# Patient Record
Sex: FEMALE | Race: WHITE | NOT HISPANIC OR LATINO | Employment: FULL TIME | URBAN - METROPOLITAN AREA
[De-identification: names, ages, dates, MRNs, and addresses within clinical notes are randomized per-mention and may not be internally consistent; named-entity substitution may affect disease eponyms.]

---

## 2020-08-05 ENCOUNTER — TELEMEDICINE (OUTPATIENT)
Dept: FAMILY MEDICINE CLINIC | Facility: CLINIC | Age: 25
End: 2020-08-05

## 2020-08-05 DIAGNOSIS — R05.9 COUGH: Primary | ICD-10-CM

## 2020-08-05 DIAGNOSIS — R53.83 EXHAUSTION: ICD-10-CM

## 2020-08-05 PROCEDURE — 99203 OFFICE O/P NEW LOW 30 MIN: CPT | Performed by: FAMILY MEDICINE

## 2020-08-05 NOTE — PROGRESS NOTES
" VIDEO VISIT  Enedina Maxwell is a 25 y.o. female.     Chief Complaint   Patient presents with   • Cough       HPI     Wants to establish care and discuss some recent episodes that are concerning to her.     About a month ago had a cough and low grade fever for an evening followed by a day of general malaise. Cough is never present during the day.  Seems these episodes always happen following a rather stressful day.  She herself is wondered if this is potentially stress related.  Has not been sleeping well, staying up late at night to do craft orders for her friends.   has recommended that she get more sleep.  On the days following these episodes she usually does sleep most of the day and \"catches up\".  Feels significantly better thereafter.    Recently seen by OB/GYN, diagnosed with probable PCOS.  Has been working hard to lose weight, hoping that that controls her symptoms.  Reports a recent weight loss of about 65 pounds over the past 6 months.    The following portions of the patient's history were reviewed and updated as appropriate: allergies, current medications, past family history, past medical history, past social history, past surgical history and problem list.    Review of Systems   Constitutional: Negative for chills, fatigue, fever and unexpected weight change.   HENT: Negative for sore throat.    Respiratory: Negative for cough, shortness of breath and wheezing.    Cardiovascular: Negative for chest pain, palpitations and leg swelling.   Gastrointestinal: Negative for abdominal distention, abdominal pain, constipation, diarrhea, nausea and vomiting.   Genitourinary: Negative for dysuria.   Musculoskeletal: Negative for arthralgias and myalgias.   Skin: Negative for rash.   Neurological: Negative for dizziness.   Psychiatric/Behavioral: Negative for confusion and dysphoric mood. The patient is not nervous/anxious.          Objective  There were no vitals filed for this visit.  There is no height " or weight on file to calculate BMI.    Physical Exam   Constitutional: She is oriented to person, place, and time. She appears well-developed and well-nourished. No distress.   HENT:   Right Ear: External ear normal.   Left Ear: External ear normal.   Nose: Nose normal.   Eyes: Pupils are equal, round, and reactive to light. EOM are normal.   Neck: Normal range of motion.   Pulmonary/Chest: Effort normal. No respiratory distress.   Neurological: She is alert and oriented to person, place, and time.   Skin: Skin is dry.   Psychiatric: She has a normal mood and affect. Her behavior is normal.       No current outpatient medications on file.  Current outpatient and discharge medications have been reconciled for the patient.  Reviewed by: Nic Vasquez MD      Procedures    Lab Results (most recent)     None                  Enedina was seen today for cough.    Diagnoses and all orders for this visit:    Cough  -     COVID-19,[facility] ROUTINE, NP/OP SWAB IN TRANSPORT MEDIA OR ESWAB 72 HR TAT - Swab, Anterior nasal; Future    Exhaustion    Will check COVID labs as above given the fact that she is missed some work.  This will do very least to put her employer at ease.    Discussed with her that this is likely related to exhaustion and that taking better care of herself may very well resolve all of her symptoms.  Recommended that she try to get some regular sleep and see if this goes away.  She will let me know if it does not.    Any information loaded into the AVS was placed there by ARSALAN Vasquez MD, and patient was counseled on the same.   Return in about 3 months (around 11/5/2020), or if symptoms worsen or fail to improve.      ARSALAN Vasquez MD

## 2020-08-18 ENCOUNTER — OFFICE VISIT (OUTPATIENT)
Dept: FAMILY MEDICINE CLINIC | Facility: CLINIC | Age: 25
End: 2020-08-18

## 2020-08-18 VITALS
HEART RATE: 56 BPM | RESPIRATION RATE: 16 BRPM | BODY MASS INDEX: 28.12 KG/M2 | WEIGHT: 175 LBS | HEIGHT: 66 IN | TEMPERATURE: 97.3 F | DIASTOLIC BLOOD PRESSURE: 80 MMHG | SYSTOLIC BLOOD PRESSURE: 130 MMHG | OXYGEN SATURATION: 99 %

## 2020-08-18 DIAGNOSIS — J38.3 VOCAL CORD DYSFUNCTION: Primary | ICD-10-CM

## 2020-08-18 PROCEDURE — 99213 OFFICE O/P EST LOW 20 MIN: CPT | Performed by: FAMILY MEDICINE

## 2020-08-18 NOTE — PATIENT INSTRUCTIONS
Ear drops - 1/2 white vinegar, 1/2 rubbing alcohol - 1-2 drops per ear after showers    Mindfulness apps: Headspace, Insight Timer    Mindfulness-Based Stress Reduction  Mindfulness-based stress reduction (MBSR) is a program that helps people learn to practice mindfulness. Mindfulness is the practice of intentionally paying attention to the present moment. It can be learned and practiced through techniques such as education, breathing exercises, meditation, and yoga. MBSR includes several mindfulness techniques in one program.  MBSR works best when you understand the treatment, are willing to try new things, and can commit to spending time practicing what you learn. MBSR training may include learning about:  · How your emotions, thoughts, and reactions affect your body.  · New ways to respond to things that cause negative thoughts to start (triggers).  · How to notice your thoughts and let go of them.  · Practicing awareness of everyday things that you normally do without thinking.  · The techniques and goals of different types of meditation.  What are the benefits of MBSR?  MBSR can have many benefits, which include helping you to:  · Develop self-awareness. This refers to knowing and understanding yourself.  · Learn skills and attitudes that help you to participate in your own health care.  · Learn new ways to care for yourself.  · Be more accepting about how things are, and let things go.  · Be less judgmental and approach things with an open mind.  · Be patient with yourself and trust yourself more.  MBSR has also been shown to:  · Reduce negative emotions, such as depression and anxiety.  · Improve memory and focus.  · Change how you sense and approach pain.  · Boost your body's ability to fight infections.  · Help you connect better with other people.  · Improve your sense of well-being.  Follow these instructions at home:    · Find a local in-person or online MBSR program.  · Set aside some time regularly for  mindfulness practice.  · Find a mindfulness practice that works best for you. This may include one or more of the following:  ? Meditation. Meditation involves focusing your mind on a certain thought or activity.  ? Breathing awareness exercises. These help you to stay present by focusing on your breath.  ? Body scan. For this practice, you lie down and pay attention to each part of your body from head to toe. You can identify tension and soreness and intentionally relax parts of your body.  ? Yoga. Yoga involves stretching and breathing, and it can improve your ability to move and be flexible. It can also provide an experience of testing your body's limits, which can help you release stress.  ? Mindful eating. This way of eating involves focusing on the taste, texture, color, and smell of each bite of food. Because this slows down eating and helps you feel full sooner, it can be an important part of a weight-loss plan.  · Find a podcast or recording that provides guidance for breathing awareness, body scan, or meditation exercises. You can listen to these any time when you have a free moment to rest without distractions.  · Follow your treatment plan as told by your health care provider. This may include taking regular medicines and making changes to your diet or lifestyle as recommended.  How to practice mindfulness  To do a basic awareness exercise:  · Find a comfortable place to sit.  · Pay attention to the present moment. Observe your thoughts, feelings, and surroundings just as they are.  · Avoid placing judgment on yourself, your feelings, or your surroundings. Make note of any judgment that comes up, and let it go.  · Your mind may wander, and that is okay. Make note of when your thoughts drift, and return your attention to the present moment.  To do basic mindfulness meditation:  · Find a comfortable place to sit. This may include a stable chair or a firm floor cushion.  ? Sit upright with your back  straight. Let your arms fall next to your side with your hands resting on your legs.  ? If sitting in a chair, rest your feet flat on the floor.  ? If sitting on a cushion, cross your legs in front of you.  · Keep your head in a neutral position with your chin dropped slightly. Relax your jaw and rest the tip of your tongue on the roof of your mouth. Drop your gaze to the floor. You can close your eyes if you like.  · Breathe normally and pay attention to your breath. Feel the air moving in and out of your nose. Feel your belly expanding and relaxing with each breath.  · Your mind may wander, and that is okay. Make note of when your thoughts drift, and return your attention to your breath.  · Avoid placing judgment on yourself, your feelings, or your surroundings. Make note of any judgment or feelings that come up, let them go, and bring your attention back to your breath.  · When you are ready, lift your gaze or open your eyes. Pay attention to how your body feels after the meditation.  Where to find more information  You can find more information about MBSR from:  · Your health care provider.  · Community-based meditation centers or programs.  · Programs offered near you.  Summary  · Mindfulness-based stress reduction (MBSR) is a program that teaches you how to intentionally pay attention to the present moment. It is used with other treatments to help you cope better with daily stress, emotions, and pain.  · MBSR focuses on developing self-awareness, which allows you to respond to life stress without judgment or negative emotions.  · MBSR programs may involve learning different mindfulness practices, such as breathing exercises, meditation, yoga, body scan, or mindful eating. Find a mindfulness practice that works best for you, and set aside time for it on a regular basis.  This information is not intended to replace advice given to you by your health care provider. Make sure you discuss any questions you have  with your health care provider.  Document Released: 04/26/2018 Document Revised: 11/30/2018 Document Reviewed: 04/26/2018  Elsevier Patient Education © 2020 [employer]       Antiemetics to include: Fosaprepitant, ondansetron, lorazepam, olanzapine  IV hydration

## 2020-08-18 NOTE — PROGRESS NOTES
Enedina Maxwell is 25 y.o. and presents today for:     Chief Complaint   Patient presents with   • Cough       HPI     Here with ongoing problems with paroxysms of cough and breathlessness, most common at night.  Most recent episode was 2 nights ago.  Woke up with some cough, felt like the cough was not letting up.  Eventually had some posttussive emesis and felt poorly for most of the next day.  Had a video visit for this same issue earlier, COVID testing was negative.  Denies any recent fevers or chills.  Denies any general shortness of breath.  Is quite active and able to exercise without difficulty.  Does note some general feelings of anxiety surrounding these episodes.  Does not sleep with any air blowing on her face.  No history of allergies or asthma.    Does note that she was making some noises with breathing, most frequently with inspiration.  Felt that she could not get a deep breath which contributed to her overall anxiety and the situation.    The following portions of the patient's history were reviewed and updated as appropriate: allergies, current medications, past family history, past medical history, past social history, past surgical history and problem list.    Review of Systems   Constitutional: Negative for activity change, chills, fatigue and fever.   Respiratory: Positive for cough, chest tightness and shortness of breath.    Psychiatric/Behavioral: Positive for sleep disturbance. The patient is nervous/anxious.        Objective  Vitals:    08/18/20 1006   BP: 130/80   Pulse: 56   Resp: 16   Temp: 97.3 °F (36.3 °C)   SpO2: 99%     Body mass index is 28.25 kg/m².    Physical Exam   Constitutional: She is oriented to person, place, and time. She appears well-developed and well-nourished. No distress.   HENT:   Right Ear: External ear normal.   Left Ear: External ear normal.   Nose: Nose normal.   Mouth/Throat: Oropharynx is clear and moist.   Eyes: Pupils are equal, round, and reactive to  light. EOM are normal.   Neck: Normal range of motion. Neck supple.   Cardiovascular: Normal rate, regular rhythm, normal heart sounds and intact distal pulses.   No murmur heard.  Pulmonary/Chest: Effort normal and breath sounds normal. No respiratory distress. She has no wheezes.   Abdominal: Soft. Bowel sounds are normal. There is no tenderness. There is no rebound and no guarding.   Musculoskeletal: Normal range of motion.   Neurological: She is alert and oriented to person, place, and time.   Skin: Skin is warm and dry.   Psychiatric: She has a normal mood and affect. Her behavior is normal.   Nursing note and vitals reviewed.      No current outpatient medications on file.  Current outpatient and discharge medications have been reconciled for the patient.  Reviewed by: Nic Vasquez MD      Procedures    Lab Results (most recent)     None                Enedina was seen today for cough.    Diagnoses and all orders for this visit:    Vocal cord dysfunction    We long discussion about these episodes, I do think that they likely represent anxiety attacks with some vocal cord dysfunction.  Her exam is otherwise completely unremarkable without suggestion of allergies or asthma.  She seems amenable to this explanation.  We explained some tricks she can try to help decrease the anxiety during these attacks and talk herself through them.  Given some references for mindfulness in her after visit summary today.  She will follow-up if the episodes continue.  Could consider referral to otolaryngology for more formal diagnosis as needed.    Any information loaded into the AVS was placed there by ARSALAN Vasquez MD, and patient was counseled on the same.   Return in about 3 months (around 11/18/2020), or if symptoms worsen or fail to improve.      ARSALAN Vasquez MD

## 2021-04-16 ENCOUNTER — BULK ORDERING (OUTPATIENT)
Dept: CASE MANAGEMENT | Facility: OTHER | Age: 26
End: 2021-04-16

## 2021-04-16 DIAGNOSIS — Z23 IMMUNIZATION DUE: ICD-10-CM

## 2022-05-10 LAB
EXTERNAL ABO GROUPING: NORMAL
EXTERNAL ANTIBODY SCREEN: NEGATIVE
EXTERNAL HEPATITIS B SURFACE ANTIGEN: NEGATIVE
EXTERNAL HEPATITIS C AB: NEGATIVE
EXTERNAL RH FACTOR: POSITIVE
EXTERNAL RUBELLA QUALITATIVE: NORMAL
EXTERNAL SYPHILIS RPR SCREEN: NORMAL
HIV1 P24 AG SERPL QL IA: NEGATIVE

## 2022-11-28 LAB — EXTERNAL GROUP B STREP ANTIGEN: POSITIVE

## 2022-12-19 ENCOUNTER — HOSPITAL ENCOUNTER (INPATIENT)
Facility: HOSPITAL | Age: 27
LOS: 2 days | Discharge: HOME | End: 2022-12-21
Attending: OBSTETRICS & GYNECOLOGY | Admitting: OBSTETRICS & GYNECOLOGY
Payer: COMMERCIAL

## 2022-12-19 PROBLEM — Z34.90 PREGNANCY: Status: ACTIVE | Noted: 2022-12-19

## 2022-12-19 PROBLEM — Z34.90 PREGNANCY: Status: RESOLVED | Noted: 2022-12-19 | Resolved: 2022-12-19

## 2022-12-19 LAB
ABO GROUP BLD: NORMAL
ALBUMIN SERPL-MCNC: 4 G/DL (ref 3.5–5.2)
ALBUMIN/GLOB SERPL: 1.1 G/DL
ALP SERPL-CCNC: 195 U/L (ref 39–117)
ALT SERPL W P-5'-P-CCNC: 10 U/L (ref 1–33)
ANION GAP SERPL CALCULATED.3IONS-SCNC: 16.3 MMOL/L (ref 5–15)
AST SERPL-CCNC: 21 U/L (ref 1–32)
BASOPHILS # BLD AUTO: 0.02 10*3/MM3 (ref 0–0.2)
BASOPHILS NFR BLD AUTO: 0.1 % (ref 0–1.5)
BILIRUB SERPL-MCNC: 0.4 MG/DL (ref 0–1.2)
BLD GP AB SCN SERPL QL: NEGATIVE
BUN SERPL-MCNC: 8 MG/DL (ref 6–20)
BUN/CREAT SERPL: 12.1 (ref 7–25)
CALCIUM SPEC-SCNC: 10 MG/DL (ref 8.6–10.5)
CHLORIDE SERPL-SCNC: 98 MMOL/L (ref 98–107)
CO2 SERPL-SCNC: 20.7 MMOL/L (ref 22–29)
CREAT SERPL-MCNC: 0.66 MG/DL (ref 0.57–1)
DEPRECATED RDW RBC AUTO: 37.9 FL (ref 37–54)
EGFRCR SERPLBLD CKD-EPI 2021: 123.5 ML/MIN/1.73
EOSINOPHIL # BLD AUTO: 0 10*3/MM3 (ref 0–0.4)
EOSINOPHIL NFR BLD AUTO: 0 % (ref 0.3–6.2)
ERYTHROCYTE [DISTWIDTH] IN BLOOD BY AUTOMATED COUNT: 12.1 % (ref 12.3–15.4)
GLOBULIN UR ELPH-MCNC: 3.6 GM/DL
GLUCOSE SERPL-MCNC: 76 MG/DL (ref 65–99)
HCT VFR BLD AUTO: 38.1 % (ref 34–46.6)
HGB BLD-MCNC: 13.6 G/DL (ref 12–15.9)
LYMPHOCYTES # BLD AUTO: 0.98 10*3/MM3 (ref 0.7–3.1)
LYMPHOCYTES NFR BLD AUTO: 5.8 % (ref 19.6–45.3)
MCH RBC QN AUTO: 30.6 PG (ref 26.6–33)
MCHC RBC AUTO-ENTMCNC: 35.7 G/DL (ref 31.5–35.7)
MCV RBC AUTO: 85.6 FL (ref 79–97)
MONOCYTES # BLD AUTO: 0.46 10*3/MM3 (ref 0.1–0.9)
MONOCYTES NFR BLD AUTO: 2.7 % (ref 5–12)
NEUTROPHILS NFR BLD AUTO: 15.25 10*3/MM3 (ref 1.7–7)
NEUTROPHILS NFR BLD AUTO: 90.7 % (ref 42.7–76)
PLATELET # BLD AUTO: 197 10*3/MM3 (ref 140–450)
PMV BLD AUTO: 12.9 FL (ref 6–12)
POTASSIUM SERPL-SCNC: 3.9 MMOL/L (ref 3.5–5.2)
PROT SERPL-MCNC: 7.6 G/DL (ref 6–8.5)
RBC # BLD AUTO: 4.45 10*6/MM3 (ref 3.77–5.28)
RH BLD: POSITIVE
SODIUM SERPL-SCNC: 135 MMOL/L (ref 136–145)
T&S EXPIRATION DATE: NORMAL
WBC NRBC COR # BLD: 16.83 10*3/MM3 (ref 3.4–10.8)

## 2022-12-19 PROCEDURE — 25010000002 OXYTOCIN PER 10 UNITS: Performed by: OBSTETRICS & GYNECOLOGY

## 2022-12-19 PROCEDURE — 86900 BLOOD TYPING SEROLOGIC ABO: CPT | Performed by: OBSTETRICS & GYNECOLOGY

## 2022-12-19 PROCEDURE — 99202 OFFICE O/P NEW SF 15 MIN: CPT | Performed by: OBSTETRICS & GYNECOLOGY

## 2022-12-19 PROCEDURE — 85025 COMPLETE CBC W/AUTO DIFF WBC: CPT | Performed by: OBSTETRICS & GYNECOLOGY

## 2022-12-19 PROCEDURE — 86901 BLOOD TYPING SEROLOGIC RH(D): CPT | Performed by: OBSTETRICS & GYNECOLOGY

## 2022-12-19 PROCEDURE — 25010000002 PENICILLIN G POTASSIUM PER 600000 UNITS: Performed by: OBSTETRICS & GYNECOLOGY

## 2022-12-19 PROCEDURE — 25010000002 ONDANSETRON PER 1 MG: Performed by: OBSTETRICS & GYNECOLOGY

## 2022-12-19 PROCEDURE — 80053 COMPREHEN METABOLIC PANEL: CPT | Performed by: OBSTETRICS & GYNECOLOGY

## 2022-12-19 PROCEDURE — 86850 RBC ANTIBODY SCREEN: CPT | Performed by: OBSTETRICS & GYNECOLOGY

## 2022-12-19 PROCEDURE — 0KQM0ZZ REPAIR PERINEUM MUSCLE, OPEN APPROACH: ICD-10-PCS | Performed by: OBSTETRICS & GYNECOLOGY

## 2022-12-19 RX ORDER — SODIUM CHLORIDE 0.9 % (FLUSH) 0.9 %
10 SYRINGE (ML) INJECTION AS NEEDED
Status: DISCONTINUED | OUTPATIENT
Start: 2022-12-19 | End: 2022-12-19

## 2022-12-19 RX ORDER — PENICILLIN G 3000000 [IU]/50ML
3 INJECTION, SOLUTION INTRAVENOUS EVERY 4 HOURS
Status: DISCONTINUED | OUTPATIENT
Start: 2022-12-19 | End: 2022-12-19 | Stop reason: HOSPADM

## 2022-12-19 RX ORDER — SODIUM CHLORIDE, SODIUM LACTATE, POTASSIUM CHLORIDE, CALCIUM CHLORIDE 600; 310; 30; 20 MG/100ML; MG/100ML; MG/100ML; MG/100ML
125 INJECTION, SOLUTION INTRAVENOUS CONTINUOUS
Status: DISCONTINUED | OUTPATIENT
Start: 2022-12-19 | End: 2022-12-20

## 2022-12-19 RX ORDER — MAGNESIUM CARB/ALUMINUM HYDROX 105-160MG
30 TABLET,CHEWABLE ORAL ONCE AS NEEDED
Status: DISCONTINUED | OUTPATIENT
Start: 2022-12-19 | End: 2022-12-19

## 2022-12-19 RX ORDER — ONDANSETRON 2 MG/ML
4 INJECTION INTRAMUSCULAR; INTRAVENOUS EVERY 6 HOURS PRN
Status: DISCONTINUED | OUTPATIENT
Start: 2022-12-19 | End: 2022-12-19 | Stop reason: HOSPADM

## 2022-12-19 RX ORDER — MISOPROSTOL 200 UG/1
800 TABLET ORAL ONCE AS NEEDED
Status: DISCONTINUED | OUTPATIENT
Start: 2022-12-19 | End: 2022-12-19 | Stop reason: HOSPADM

## 2022-12-19 RX ORDER — OXYTOCIN/0.9 % SODIUM CHLORIDE 30/500 ML
125 PLASTIC BAG, INJECTION (ML) INTRAVENOUS CONTINUOUS PRN
Status: COMPLETED | OUTPATIENT
Start: 2022-12-19 | End: 2022-12-19

## 2022-12-19 RX ORDER — OXYCODONE AND ACETAMINOPHEN 10; 325 MG/1; MG/1
1 TABLET ORAL EVERY 4 HOURS PRN
Status: DISCONTINUED | OUTPATIENT
Start: 2022-12-19 | End: 2022-12-21 | Stop reason: HOSPADM

## 2022-12-19 RX ORDER — IBUPROFEN 800 MG/1
800 TABLET ORAL EVERY 8 HOURS PRN
Status: DISCONTINUED | OUTPATIENT
Start: 2022-12-19 | End: 2022-12-21 | Stop reason: HOSPADM

## 2022-12-19 RX ORDER — FAMOTIDINE 10 MG/ML
20 INJECTION, SOLUTION INTRAVENOUS 2 TIMES DAILY PRN
Status: DISCONTINUED | OUTPATIENT
Start: 2022-12-19 | End: 2022-12-19 | Stop reason: HOSPADM

## 2022-12-19 RX ORDER — METHYLERGONOVINE MALEATE 0.2 MG/ML
200 INJECTION INTRAVENOUS ONCE AS NEEDED
Status: DISCONTINUED | OUTPATIENT
Start: 2022-12-19 | End: 2022-12-19 | Stop reason: HOSPADM

## 2022-12-19 RX ORDER — LIDOCAINE HYDROCHLORIDE 20 MG/ML
JELLY TOPICAL ONCE
Status: COMPLETED | OUTPATIENT
Start: 2022-12-19 | End: 2022-12-19

## 2022-12-19 RX ORDER — OXYCODONE HYDROCHLORIDE AND ACETAMINOPHEN 5; 325 MG/1; MG/1
1 TABLET ORAL EVERY 4 HOURS PRN
Status: DISCONTINUED | OUTPATIENT
Start: 2022-12-19 | End: 2022-12-21 | Stop reason: HOSPADM

## 2022-12-19 RX ORDER — ONDANSETRON 4 MG/1
4 TABLET, FILM COATED ORAL EVERY 6 HOURS PRN
Status: DISCONTINUED | OUTPATIENT
Start: 2022-12-19 | End: 2022-12-19 | Stop reason: HOSPADM

## 2022-12-19 RX ORDER — OXYTOCIN/0.9 % SODIUM CHLORIDE 30/500 ML
250 PLASTIC BAG, INJECTION (ML) INTRAVENOUS CONTINUOUS
Status: DISPENSED | OUTPATIENT
Start: 2022-12-19 | End: 2022-12-19

## 2022-12-19 RX ORDER — TERBUTALINE SULFATE 1 MG/ML
0.25 INJECTION, SOLUTION SUBCUTANEOUS AS NEEDED
Status: DISCONTINUED | OUTPATIENT
Start: 2022-12-19 | End: 2022-12-19 | Stop reason: HOSPADM

## 2022-12-19 RX ORDER — ACETAMINOPHEN 325 MG/1
650 TABLET ORAL EVERY 4 HOURS PRN
Status: DISCONTINUED | OUTPATIENT
Start: 2022-12-19 | End: 2022-12-19 | Stop reason: HOSPADM

## 2022-12-19 RX ORDER — FAMOTIDINE 20 MG/1
20 TABLET, FILM COATED ORAL 2 TIMES DAILY PRN
Status: DISCONTINUED | OUTPATIENT
Start: 2022-12-19 | End: 2022-12-19 | Stop reason: HOSPADM

## 2022-12-19 RX ORDER — OXYTOCIN/0.9 % SODIUM CHLORIDE 30/500 ML
999 PLASTIC BAG, INJECTION (ML) INTRAVENOUS ONCE
Status: COMPLETED | OUTPATIENT
Start: 2022-12-19 | End: 2022-12-19

## 2022-12-19 RX ORDER — CARBOPROST TROMETHAMINE 250 UG/ML
250 INJECTION, SOLUTION INTRAMUSCULAR
Status: DISCONTINUED | OUTPATIENT
Start: 2022-12-19 | End: 2022-12-19 | Stop reason: HOSPADM

## 2022-12-19 RX ORDER — SODIUM CHLORIDE 0.9 % (FLUSH) 0.9 %
10 SYRINGE (ML) INJECTION EVERY 12 HOURS SCHEDULED
Status: DISCONTINUED | OUTPATIENT
Start: 2022-12-19 | End: 2022-12-19

## 2022-12-19 RX ADMIN — PENICILLIN G POTASSIUM 5 MILLION UNITS: 5000000 INJECTION, POWDER, FOR SOLUTION INTRAMUSCULAR; INTRAVENOUS at 17:38

## 2022-12-19 RX ADMIN — OXYCODONE AND ACETAMINOPHEN 1 TABLET: 325; 10 TABLET ORAL at 22:19

## 2022-12-19 RX ADMIN — ONDANSETRON 4 MG: 2 INJECTION INTRAMUSCULAR; INTRAVENOUS at 20:32

## 2022-12-19 RX ADMIN — OXYTOCIN 999 ML/HR: 10 INJECTION, SOLUTION INTRAMUSCULAR; INTRAVENOUS at 21:26

## 2022-12-19 RX ADMIN — OXYTOCIN 125 ML/HR: 10 INJECTION, SOLUTION INTRAMUSCULAR; INTRAVENOUS at 23:09

## 2022-12-19 RX ADMIN — LIDOCAINE HYDROCHLORIDE 1 ML: 20 JELLY TOPICAL at 21:31

## 2022-12-19 RX ADMIN — SODIUM CHLORIDE, POTASSIUM CHLORIDE, SODIUM LACTATE AND CALCIUM CHLORIDE 999 ML/HR: 600; 310; 30; 20 INJECTION, SOLUTION INTRAVENOUS at 17:37

## 2022-12-19 NOTE — OBED NOTES
BETTYE Note OB        Patient Name: Enedina Maxwell  YOB: 1995  MRN: [id number]  Admission Date: 2022  3:30 PM  Date of Service: 2022    Chief Complaint: Contractions (BETTYE: Patient c/o contractions every 2 minutes for the last 2 hours, rating  8/10 on the pain scale.  Good FM, feels like SROM around 1100, no VB.)        Subjective     Enedina Maxwell is a 27 y.o. female  at 38w6d with Estimated Date of Delivery: 22 who presents with the chief complaint listed above.  She sees Merry Louis MD for her prenatal care. Her pregnancy has been complicated by:  anemia, GBS carrier.        She describes fetal movement as normal.  She has rupture of membranes.  She denies vaginal bleeding. She is feeling contractions.          Objective   Patient Active Problem List    Diagnosis    • *Pregnancy [Z34.90]    • Vocal cord dysfunction [J38.3]         OB History    Para Term  AB Living   1 0 0 0 0 0   SAB IAB Ectopic Molar Multiple Live Births   0 0 0 0 0 0      # Outcome Date GA Lbr Wilson/2nd Weight Sex Delivery Anes PTL Lv   1 Current                 Past Medical History:   Diagnosis Date   • Anxiety    • PCOS (polycystic ovarian syndrome)        No past surgical history on file.    No current facility-administered medications on file prior to encounter.     Current Outpatient Medications on File Prior to Encounter   Medication Sig Dispense Refill   • Prenatal Vit-Fe Fumarate-FA (PRENATAL VITAMIN PO) Take  by mouth.         No Known Allergies    Family History   Problem Relation Age of Onset   • No Known Problems Mother    • No Known Problems Father    • No Known Problems Brother    • No Known Problems Brother    • Breast cancer Neg Hx    • Colon cancer Neg Hx        Social History     Socioeconomic History   • Marital status:    Tobacco Use   • Smoking status: Never   • Smokeless tobacco: Never   Substance and Sexual Activity   • Alcohol use: Never   • Drug use:  Never   • Sexual activity: Yes     Partners: Male     Comment:             Review of Systems   Constitutional: Negative for chills, fatigue and fever.   HENT: Negative for congestion, rhinorrhea and sore throat.    Eyes: Negative for visual disturbance.   Respiratory: Negative.    Cardiovascular: Negative.    Gastrointestinal: Positive for abdominal pain. Negative for constipation, diarrhea, nausea and vomiting.   Genitourinary: Positive for vaginal discharge. Negative for difficulty urinating, dyspareunia, dysuria, flank pain, frequency, genital sores, hematuria, pelvic pain, urgency, vaginal bleeding and vaginal pain.   Neurological: Negative for dizziness, seizures, light-headedness and headaches.   Psychiatric/Behavioral: Negative for sleep disturbance. The patient is not nervous/anxious.           PHYSICAL EXAM:      VITAL SIGNS:  Vitals:    12/19/22 1548   BP: 112/70   BP Location: Right arm   Patient Position: Lying   Pulse: 88   Resp: 16   Temp: 98.2 °F (36.8 °C)   TempSrc: Oral   SpO2: 99%   Height: 170.2 cm (67\")        FHT'S:                   Baseline:  120 BPM  Variability:  Moderate = 6 - 25 BPM  Accelerations:  15 x 15 accelerations present     Decelerations:  absent  Contractions:   present     Interpretation:    Reactive NST, CAT 1 tracing        PHYSICAL EXAM:    General: well developed; well nourished  no acute distress   Heart: Not performed.   Lungs  : breathing is unlabored     Abdomen: soft, non-tender; no masses  no umbilical or inguinal hernias are present  no hepato-splenomegaly       Cervix: was checked (by RN): 4 cm / 80 % / -2  Cervical Dilation (cm): 4  Cervical Effacement: 80%  Fetal Station: -2  Cervical Consistency: soft  Cervical Position: mid-position   Contractions: regular        Extremities: peripheral pulses normal, no pedal edema, no clubbing or cyanosis      LABS AND TESTING ORDERED:  1. Uterine and fetal monitoring      LAB RESULTS:    No results found for this or any  previous visit (from the past 24 hour(s)).    Lab Results   Component Value Date    ABO B 05/10/2022    RH Positive 05/10/2022       Lab Results   Component Value Date    STREPGPB Positive 2022                 Assessment & Plan     ASSESSMENT/PLAN:  Enedina Maxwell is a 27 y.o. female  at 38w6d who presented with: gross rupture of membranes and active labor.  Patient admitted for further management and delivery.            Final Impression:  • Pregnancy at 38w6d  • Reactive NST.  CAT 1 tracing  • Rupture of membranes at term with labor  • Maternal vital signs were reviewed and were unremarkable              Vitals:    22 1548   BP: 112/70   BP Location: Right arm   Patient Position: Lying   Pulse: 88   Resp: 16   Temp: 98.2 °F (36.8 °C)   TempSrc: Oral   SpO2: 99%   Height: 170.2 cm (67\")   •     Lab Results   Component Value Date    STREPGPB Positive 2022   •   Lab Results   Component Value Date    ABO B 05/10/2022    RH Positive 05/10/2022   •   • COVID - 19 status unknown      PLAN:       I have spent 30 minutes including face to face time with the patient, greater than 50% in discussion of the diagnosis (counseling) and/or coordination of care.     Venice Lopez MD  2022  16:13 EST  OB Hospitalist  Phone:  [phone]

## 2022-12-19 NOTE — H&P
[facility]  Obstetric History and Physical    Chief Complaint   Patient presents with   • Contractions     BETTYE: Patient c/o contractions every 2 minutes for the last 2 hours, rating  8/10 on the pain scale.  Good FM, feels like SROM around 1100, no VB.       Subjective     Patient is a 27 y.o. female  currently at 38w6d, who presents with ruptured membranes.    Her prenatal care is benign.  Her previous obstetric/gynecological history is noted for is non-contributory.    The following portions of the patients history were reviewed and updated as appropriate: current medications, allergies, past medical history, past surgical history, past family history, past social history and problem list .       Prenatal Information:  Prenatal Results     POC Urine Glucose/Protein     Test Value Reference Range Date Time    Urine Glucose        Urine Protein              Initial Prenatal Labs     Test Value Reference Range Date Time    Hemoglobin        Hematocrit        Platelets        Rubella IgG ^ Immune   05/10/22     Hepatitis B SAg ^ Negative   05/10/22     Hepatitis C Ab ^ Negative   05/10/22     RPR ^ Non-Reactive   05/10/22     ABO ^ B   05/10/22     Rh ^ Positive   05/10/22     Antibody Screen ^ Negative   05/10/22     HIV ^ Negative   05/10/22     Urine Culture        Gonorrhea        Chlamydia        TSH        HgB A1c               2nd and 3rd Trimester     Test Value Reference Range Date Time    Hemoglobin (repeated)        Hematocrit (repeated)        Platelets         GCT        Antibody Screen (repeated)        GTT Fasting        GTT 1 Hr        GTT 2 Hr        GTT 3 Hr        Group B Strep ^ Positive   22           Drug Screening     Test Value Reference Range Date Time    Amphetamine Screen        Barbiturate Screen        Benzodiazepine Screen        Methadone Screen        Phencyclidine Screen        Opiates Screen        THC Screen        Cocaine Screen        Propoxyphene Screen         Buprenorphine Screen        Methamphetamine Screen        Oxycodone Screen        Tricyclic Antidepressants Screen              Other (Risk screening)     Test Value Reference Range Date Time    Varicella IgG        Parvovirus IgG        CMV IgG        Cystic Fibrosis        Hemoglobin electrophoresis        NIPT        MSAFP-4        AFP (for NTD only)              Legend    ^: Historical                      External Prenatal Results     Pregnancy Outside Results - Transcribed From Office Records - See Scanned Records For Details     Test Value Date Time    ABO ^ B  05/10/22     Rh ^ Positive  05/10/22     Antibody Screen ^ Negative  05/10/22     Varicella IgG       Rubella ^ Immune  05/10/22     Hgb       Hct       Glucose Fasting GTT       Glucose Tolerance Test 1 hour       Glucose Tolerance Test 3 hour       Gonorrhea (discrete)       Chlamydia (discrete)       RPR ^ Non-Reactive  05/10/22     VDRL       Syphilis Antibody       HBsAg ^ Negative  05/10/22     Herpes Simplex Virus PCR       Herpes Simplex VIrus Culture       HIV ^ Negative  05/10/22     Hep C RNA Quant PCR       Hep C Antibody ^ Negative  05/10/22     AFP       Group B Strep ^ Positive  22     GBS Susceptibility to Clindamycin       GBS Susceptibility to Erythromycin       Fetal Fibronectin       Genetic Testing, Maternal Blood             Drug Screening     Test Value Date Time    Urine Drug Screen       Amphetamine Screen       Barbiturate Screen       Benzodiazepine Screen       Methadone Screen       Phencyclidine Screen       Opiates Screen       THC Screen       Cocaine Screen       Propoxyphene Screen       Buprenorphine Screen       Methamphetamine Screen       Oxycodone Screen       Tricyclic Antidepressants Screen             Legend    ^: Historical                         Past OB History:     OB History    Para Term  AB Living   1 0 0 0 0 0   SAB IAB Ectopic Molar Multiple Live Births   0 0 0 0 0 0      # Outcome  Date GA Lbr Wilson/2nd Weight Sex Delivery Anes PTL Lv   1 Current                Past Medical History: Past Medical History:   Diagnosis Date   • Anxiety    • PCOS (polycystic ovarian syndrome)       Past Surgical History No past surgical history on file.   Family History: Family History   Problem Relation Age of Onset   • No Known Problems Mother    • No Known Problems Father    • No Known Problems Brother    • No Known Problems Brother    • Breast cancer Neg Hx    • Colon cancer Neg Hx       Social History:  reports that she has never smoked. She has never used smokeless tobacco.   reports no history of alcohol use.   reports no history of drug use.        General ROS: Pertinent items are noted in HPI    Objective       Vital Signs Range for the last 24 hours  Temperature: Temp:  [98.2 °F (36.8 °C)] 98.2 °F (36.8 °C)   Temp Source: Temp src: Oral   BP: BP: (112)/(70) 112/70   Pulse: Heart Rate:  [88] 88   Respirations: Resp:  [16] 16   SPO2: SpO2:  [99 %] 99 %   O2 Amount (l/min):     O2 Devices Device (Oxygen Therapy): room air   Weight:       Physical Examination: General appearance - alert, well appearing, and in no distress  Chest - clear to auscultation, no wheezes, rales or rhonchi, symmetric air entry  Heart - normal rate, regular rhythm, normal S1, S2, no murmurs, rubs, clicks or gallops  Abdomen - soft, nontender, nondistended, no masses or organomegaly  Pelvic - normal external genitalia, vulva, vagina, cervix, uterus and adnexa    Presentation: cephalic   Cervix: Exam by: Method: sterile exam per RN   Dilation: Cervical Dilation (cm): 4   Effacement: Cervical Effacement: 80%   Station:         Fetal Heart Rate Assessment   Method:     Beats/min:     Baseline:     Variability:     Accels:     Decels:     Tracing Category:       Uterine Assessment   Method:     Frequency (min):     Ctx Count in 10 min:     Duration:     Intensity:     Intensity by IUPC:     Resting Tone:     Resting Tone by IUPC:      [place] Units:         Assessment & Plan       Pregnancy        Assessment:  1.  Intrauterine pregnancy at 38w6d gestation with reassuring fetal status.    2.  Ruptured membranes, beginning to labor  3.  Obstetrical history significant for is non-contributory.  4.  GBS status:   External Strep Group B Ag   Date Value Ref Range Status   11/28/2022 Positive  Final       Plan:  1. Admit, antibiotics, augment if needed.  Hoping to go unmedicated.  2. Plan of care has been reviewed with patient and family.  3.  Risks, benefits of treatment plan have been discussed.  4.  All questions have been answered.        Priyanka Roman MD  12/19/2022  17:12 EST

## 2022-12-20 LAB
BASOPHILS # BLD AUTO: 0.01 10*3/MM3 (ref 0–0.2)
BASOPHILS NFR BLD AUTO: 0.1 % (ref 0–1.5)
DEPRECATED RDW RBC AUTO: 40.3 FL (ref 37–54)
EOSINOPHIL # BLD AUTO: 0 10*3/MM3 (ref 0–0.4)
EOSINOPHIL NFR BLD AUTO: 0 % (ref 0.3–6.2)
ERYTHROCYTE [DISTWIDTH] IN BLOOD BY AUTOMATED COUNT: 12.3 % (ref 12.3–15.4)
HCT VFR BLD AUTO: 30 % (ref 34–46.6)
HGB BLD-MCNC: 10.3 G/DL (ref 12–15.9)
IMM GRANULOCYTES # BLD AUTO: 0.1 10*3/MM3 (ref 0–0.05)
IMM GRANULOCYTES NFR BLD AUTO: 0.6 % (ref 0–0.5)
LYMPHOCYTES # BLD AUTO: 1.34 10*3/MM3 (ref 0.7–3.1)
LYMPHOCYTES NFR BLD AUTO: 7.4 % (ref 19.6–45.3)
MCH RBC QN AUTO: 30.8 PG (ref 26.6–33)
MCHC RBC AUTO-ENTMCNC: 34.3 G/DL (ref 31.5–35.7)
MCV RBC AUTO: 89.8 FL (ref 79–97)
MONOCYTES # BLD AUTO: 1.24 10*3/MM3 (ref 0.1–0.9)
MONOCYTES NFR BLD AUTO: 6.8 % (ref 5–12)
NEUTROPHILS NFR BLD AUTO: 15.49 10*3/MM3 (ref 1.7–7)
NEUTROPHILS NFR BLD AUTO: 85.1 % (ref 42.7–76)
NRBC BLD AUTO-RTO: 0 /100 WBC (ref 0–0.2)
PLATELET # BLD AUTO: 163 10*3/MM3 (ref 140–450)
PMV BLD AUTO: 13 FL (ref 6–12)
RBC # BLD AUTO: 3.34 10*6/MM3 (ref 3.77–5.28)
WBC NRBC COR # BLD: 18.18 10*3/MM3 (ref 3.4–10.8)

## 2022-12-20 PROCEDURE — 85025 COMPLETE CBC W/AUTO DIFF WBC: CPT | Performed by: OBSTETRICS & GYNECOLOGY

## 2022-12-20 RX ORDER — BISACODYL 10 MG
10 SUPPOSITORY, RECTAL RECTAL DAILY PRN
Status: DISCONTINUED | OUTPATIENT
Start: 2022-12-20 | End: 2022-12-21 | Stop reason: HOSPADM

## 2022-12-20 RX ORDER — MISOPROSTOL 200 UG/1
600 TABLET ORAL ONCE AS NEEDED
Status: DISCONTINUED | OUTPATIENT
Start: 2022-12-20 | End: 2022-12-21 | Stop reason: HOSPADM

## 2022-12-20 RX ORDER — ONDANSETRON 2 MG/ML
4 INJECTION INTRAMUSCULAR; INTRAVENOUS EVERY 6 HOURS PRN
Status: DISCONTINUED | OUTPATIENT
Start: 2022-12-20 | End: 2022-12-21 | Stop reason: HOSPADM

## 2022-12-20 RX ORDER — ONDANSETRON 4 MG/1
4 TABLET, FILM COATED ORAL EVERY 8 HOURS PRN
Status: DISCONTINUED | OUTPATIENT
Start: 2022-12-20 | End: 2022-12-21 | Stop reason: HOSPADM

## 2022-12-20 RX ORDER — HYDROCORTISONE 25 MG/G
1 CREAM TOPICAL AS NEEDED
Status: DISCONTINUED | OUTPATIENT
Start: 2022-12-20 | End: 2022-12-21 | Stop reason: HOSPADM

## 2022-12-20 RX ORDER — TRANEXAMIC ACID 10 MG/ML
1000 INJECTION, SOLUTION INTRAVENOUS ONCE AS NEEDED
Status: DISCONTINUED | OUTPATIENT
Start: 2022-12-20 | End: 2022-12-21 | Stop reason: HOSPADM

## 2022-12-20 RX ORDER — HYDROXYZINE 50 MG/1
50 TABLET, FILM COATED ORAL NIGHTLY PRN
Status: DISCONTINUED | OUTPATIENT
Start: 2022-12-20 | End: 2022-12-21 | Stop reason: HOSPADM

## 2022-12-20 RX ORDER — DOCUSATE SODIUM 100 MG/1
100 CAPSULE, LIQUID FILLED ORAL 2 TIMES DAILY
Status: DISCONTINUED | OUTPATIENT
Start: 2022-12-20 | End: 2022-12-21 | Stop reason: HOSPADM

## 2022-12-20 RX ORDER — CALCIUM CARBONATE 200(500)MG
2 TABLET,CHEWABLE ORAL 3 TIMES DAILY PRN
Status: DISCONTINUED | OUTPATIENT
Start: 2022-12-20 | End: 2022-12-21 | Stop reason: HOSPADM

## 2022-12-20 RX ORDER — PROMETHAZINE HYDROCHLORIDE 25 MG/1
25 TABLET ORAL EVERY 6 HOURS PRN
Status: DISCONTINUED | OUTPATIENT
Start: 2022-12-20 | End: 2022-12-21 | Stop reason: HOSPADM

## 2022-12-20 RX ORDER — PROMETHAZINE HYDROCHLORIDE 12.5 MG/1
12.5 SUPPOSITORY RECTAL EVERY 6 HOURS PRN
Status: DISCONTINUED | OUTPATIENT
Start: 2022-12-20 | End: 2022-12-21 | Stop reason: HOSPADM

## 2022-12-20 RX ADMIN — DOCUSATE SODIUM 100 MG: 100 CAPSULE, LIQUID FILLED ORAL at 08:06

## 2022-12-20 RX ADMIN — Medication: at 02:53

## 2022-12-20 RX ADMIN — OXYCODONE AND ACETAMINOPHEN 1 TABLET: 5; 325 TABLET ORAL at 14:02

## 2022-12-20 RX ADMIN — IBUPROFEN 800 MG: 800 TABLET, FILM COATED ORAL at 10:37

## 2022-12-20 RX ADMIN — DOCUSATE SODIUM 100 MG: 100 CAPSULE, LIQUID FILLED ORAL at 20:40

## 2022-12-20 RX ADMIN — IBUPROFEN 800 MG: 800 TABLET, FILM COATED ORAL at 02:53

## 2022-12-20 NOTE — PAYOR COMM NOTE
Enedina Presley (27 y.o. Female)     ATTN: NURSE REVIEWER  RE: INITIAL INPATIENT MATERNITY CLINICALS  REF# [id number]  PLEASE REPLY TO MARIA LUISA PLUMMER OR BRANDI [facility] [phone] OR FAX# [phone]      Date of Birth   1995    Social Security Number       Address   [address] [place] [address] [address] [facility]    Home Phone   [phone]    MRN   [id number]       Shinto   Unknown    Marital Status                               Admission Date   12/19/22    Admission Type   Emergency    Admitting Provider   Priyanka Roman MD    Attending Provider   Merry Louis MD    Department, Room/Bed   [facility], E356/1       Discharge Date       Discharge Disposition       Discharge Destination                               Attending Provider: Merry Louis MD    Allergies: No Known Allergies    Isolation: None   Infection: None   Code Status: CPR    Ht: 170.2 cm (67\")   Wt: 111 kg (245 lb)    Admission Cmt: None   Principal Problem: Pregnancy [Z34.90]                 Active Insurance as of 12/19/2022     Primary Coverage     Payor Plan Insurance Group Employer/Plan Group    [facility] [id number]     Payor Plan Address Payor Plan Phone Number Payor Plan Fax Number Effective Dates    [address]   10/4/2022 - None Entered    [facility] [phone]       Subscriber Name Subscriber Birth Date Member ID       ENEDINA PRESLEY 1995 [id number]                 Emergency Contacts      (Rel.) Home Phone Work Phone Mobile Phone    CLEO ONTIVEROS (Spouse) [phone] -- --               History & Physical      Priyanka Roman MD at 12/19/22 [address] [place] [address]  Obstetric History and Physical    Chief Complaint   Patient presents with   • Contractions     BETTYE: Patient c/o contractions every 2 minutes for the last 2 hours, rating  8/10 on the pain scale.  Good FM, feels like SROM around 1100, no VB.       Subjective      Patient is a 27  y.o. female  currently at 38w6d, who presents with ruptured membranes.    Her prenatal care is benign.  Her previous obstetric/gynecological history is noted for is non-contributory.    The following portions of the patients history were reviewed and updated as appropriate: current medications, allergies, past medical history, past surgical history, past family history, past social history and problem list .       Prenatal Information:  Prenatal Results     POC Urine Glucose/Protein     Test Value Reference Range Date Time    Urine Glucose        Urine Protein              Initial Prenatal Labs     Test Value Reference Range Date Time    Hemoglobin        Hematocrit        Platelets        Rubella IgG ^ Immune   05/10/22     Hepatitis B SAg ^ Negative   05/10/22     Hepatitis C Ab ^ Negative   05/10/22     RPR ^ Non-Reactive   05/10/22     ABO ^ B   05/10/22     Rh ^ Positive   05/10/22     Antibody Screen ^ Negative   05/10/22     HIV ^ Negative   05/10/22     Urine Culture        Gonorrhea        Chlamydia        TSH        HgB A1c               2nd and 3rd Trimester     Test Value Reference Range Date Time    Hemoglobin (repeated)        Hematocrit (repeated)        Platelets         GCT        Antibody Screen (repeated)        GTT Fasting        GTT 1 Hr        GTT 2 Hr        GTT 3 Hr        Group B Strep ^ Positive   22           Drug Screening     Test Value Reference Range Date Time    Amphetamine Screen        Barbiturate Screen        Benzodiazepine Screen        Methadone Screen        Phencyclidine Screen        Opiates Screen        THC Screen        Cocaine Screen        Propoxyphene Screen        Buprenorphine Screen        Methamphetamine Screen        Oxycodone Screen        Tricyclic Antidepressants Screen              Other (Risk screening)     Test Value Reference Range Date Time    Varicella IgG        Parvovirus IgG        CMV IgG        Cystic Fibrosis        Hemoglobin  electrophoresis        NIPT        MSAFP-4        AFP (for NTD only)              Legend    ^: Historical                      External Prenatal Results     Pregnancy Outside Results - Transcribed From Office Records - See Scanned Records For Details     Test Value Date Time    ABO ^ B  05/10/22     Rh ^ Positive  05/10/22     Antibody Screen ^ Negative  05/10/22     Varicella IgG       Rubella ^ Immune  05/10/22     Hgb       Hct       Glucose Fasting GTT       Glucose Tolerance Test 1 hour       Glucose Tolerance Test 3 hour       Gonorrhea (discrete)       Chlamydia (discrete)       RPR ^ Non-Reactive  05/10/22     VDRL       Syphilis Antibody       HBsAg ^ Negative  05/10/22     Herpes Simplex Virus PCR       Herpes Simplex VIrus Culture       HIV ^ Negative  05/10/22     Hep C RNA Quant PCR       Hep C Antibody ^ Negative  05/10/22     AFP       Group B Strep ^ Positive  22     GBS Susceptibility to Clindamycin       GBS Susceptibility to Erythromycin       Fetal Fibronectin       Genetic Testing, Maternal Blood             Drug Screening     Test Value Date Time    Urine Drug Screen       Amphetamine Screen       Barbiturate Screen       Benzodiazepine Screen       Methadone Screen       Phencyclidine Screen       Opiates Screen       THC Screen       Cocaine Screen       Propoxyphene Screen       Buprenorphine Screen       Methamphetamine Screen       Oxycodone Screen       Tricyclic Antidepressants Screen             Legend    ^: Historical                         Past OB History:     OB History    Para Term  AB Living   1 0 0 0 0 0   SAB IAB Ectopic Molar Multiple Live Births   0 0 0 0 0 0      # Outcome Date GA Lbr Wilson/2nd Weight Sex Delivery Anes PTL Lv   1 Current                Past Medical History: Past Medical History:   Diagnosis Date   • Anxiety    • PCOS (polycystic ovarian syndrome)       Past Surgical History No past surgical history on file.   Family History: Family History    Problem Relation Age of Onset   • No Known Problems Mother    • No Known Problems Father    • No Known Problems Brother    • No Known Problems Brother    • Breast cancer Neg Hx    • Colon cancer Neg Hx       Social History:  reports that she has never smoked. She has never used smokeless tobacco.   reports no history of alcohol use.   reports no history of drug use.        General ROS: Pertinent items are noted in HPI    Objective        Vital Signs Range for the last 24 hours  Temperature: Temp:  [98.2 °F (36.8 °C)] 98.2 °F (36.8 °C)   Temp Source: Temp src: Oral   BP: BP: (112)/(70) 112/70   Pulse: Heart Rate:  [88] 88   Respirations: Resp:  [16] 16   SPO2: SpO2:  [99 %] 99 %   O2 Amount (l/min):     O2 Devices Device (Oxygen Therapy): room air   Weight:       Physical Examination: General appearance - alert, well appearing, and in no distress  Chest - clear to auscultation, no wheezes, rales or rhonchi, symmetric air entry  Heart - normal rate, regular rhythm, normal S1, S2, no murmurs, rubs, clicks or gallops  Abdomen - soft, nontender, nondistended, no masses or organomegaly  Pelvic - normal external genitalia, vulva, vagina, cervix, uterus and adnexa    Presentation: cephalic   Cervix: Exam by: Method: sterile exam per RN   Dilation: Cervical Dilation (cm): 4   Effacement: Cervical Effacement: 80%   Station:         Fetal Heart Rate Assessment   Method:     Beats/min:     Baseline:     Variability:     Accels:     Decels:     Tracing Category:       Uterine Assessment   Method:     Frequency (min):     Ctx Count in 10 min:     Duration:     Intensity:     Intensity by IUPC:     Resting Tone:     Resting Tone by IUPC:     [place] Units:         Assessment & Plan       Pregnancy        Assessment:  1.  Intrauterine pregnancy at 38w6d gestation with reassuring fetal status.    2.  Ruptured membranes, beginning to labor  3.  Obstetrical history significant for is non-contributory.  4.  GBS status:   External  Strep Group B Ag   Date Value Ref Range Status   2022 Positive  Final       Plan:  1. Admit, antibiotics, augment if needed.  Hoping to go unmedicated.  2. Plan of care has been reviewed with patient and family.  3.  Risks, benefits of treatment plan have been discussed.  4.  All questions have been answered.        Priyanka Roman MD  2022  17:12 EST    Electronically signed by Priyanka Roman MD at 22 1714         Facility-Administered Medications as of 2022   Medication Dose Route Frequency Provider Last Rate Last Admin   • benzocaine (AMERICAINE) 20 % rectal ointment 1 application  1 application Rectal PRN Priyanka Roman MD       • benzocaine-menthol (DERMOPLAST) 20-0.5 % topical spray   Topical PRN Priyanka Roman MD   Given at 22 0253   • bisacodyl (DULCOLAX) suppository 10 mg  10 mg Rectal Daily PRN Priyanka Roman MD       • calcium carbonate (TUMS) chewable tablet 500 mg (200 mg elemental)  2 tablet Oral TID PRN Priyanka Roman MD       • docusate sodium (COLACE) capsule 100 mg  100 mg Oral BID Priyanka Roman MD   100 mg at 22 0806   • Hydrocort-Pramoxine (Perianal) (PROCTOFOAM-HS) 1-1 % rectal foam 1 application  1 application Topical PRN Priyanka Roman MD       • Hydrocortisone (Perianal) (ANUSOL-HC) 2.5 % rectal cream 1 application  1 application Rectal PRN Priyanka Roman MD       • hydrOXYzine (ATARAX) tablet 50 mg  50 mg Oral Nightly PRN Priyanka Roman MD       • ibuprofen (ADVIL,MOTRIN) tablet 800 mg  800 mg Oral Q8H PRN Priyanka Roman MD   800 mg at 22 1037   • [] lactated ringers bolus 1,000 mL  1,000 mL Intravenous Once PRN Priyanka Roman MD       • [COMPLETED] Lidocaine HCl gel (XYLOCAINE) urethral/mucosal syringe   Topical Once Priyanka Roman MD   1 mL at 22 2131   • magnesium hydroxide (MILK OF MAGNESIA) suspension 10 mL  10 mL Oral Daily PRN Priyanka Roman MD       • miSOPROStol (CYTOTEC) tablet 600 mcg  600 mcg Oral  Once PRN Priyanka Roman MD       • ondansetron (ZOFRAN) injection 4 mg  4 mg Intravenous Q6H PRN Priyanka Roman MD       • ondansetron (ZOFRAN) tablet 4 mg  4 mg Oral Q8H PRN Priyanka Roman MD       • oxyCODONE-acetaminophen (PERCOCET)  MG per tablet 1 tablet  1 tablet Oral Q4H PRN Priyanka Roman MD   1 tablet at 22 2219   • oxyCODONE-acetaminophen (PERCOCET) 5-325 MG per tablet 1 tablet  1 tablet Oral Q4H PRN Priyanka Roman MD       • [COMPLETED] oxytocin (PITOCIN) 30 units in 0.9% sodium chloride 500 mL (premix)  999 mL/hr Intravenous Once Priyanka Roman  mL/hr at 22 999 mL/hr at 22    Followed by   • [] oxytocin (PITOCIN) 30 units in 0.9% sodium chloride 500 mL (premix)  250 mL/hr Intravenous Continuous Priyanka Roman  mL/hr at 22 250 mL/hr at 22   • [COMPLETED] oxytocin (PITOCIN) 30 units in 0.9% sodium chloride 500 mL (premix)  125 mL/hr Intravenous Continuous PRN Priyanka Roman  mL/hr at 22 2309 125 mL/hr at 22 230   • [COMPLETED] penicillin G potassium 5 Million Units in sodium chloride 0.9 % 100 mL IVPB-VTB  5 Million Units Intravenous Once Priyanka Roman MD   Stopped at 22   • promethazine (PHENERGAN) tablet 25 mg  25 mg Oral Q6H PRN Priyanka Roman MD        Or   • promethazine (PHENERGAN) suppository 12.5 mg  12.5 mg Rectal Q6H PRN Priyanka Roman MD       • tranexamic acid 1000 mg in 100 mL 0.7% NaCl infusion (premix)  1,000 mg Intravenous Once PRN Priyanka Roman MD         Lab Results (last 48 hours)     Procedure Component Value Units Date/Time    CBC & Differential [[id number]]  (Abnormal) Collected: 22    Specimen: Blood Updated: 22    Narrative:      The following orders were created for panel order CBC & Differential.  Procedure                               Abnormality         Status                     ---------                                -----------         ------                     CBC Auto Differential[[id number]]        Abnormal            Final result                 Please view results for these tests on the individual orders.    CBC Auto Differential [[id number]]  (Abnormal) Collected: 12/20/22 0444    Specimen: Blood Updated: 12/20/22 0606     WBC 18.18 10*3/mm3      RBC 3.34 10*6/mm3      Hemoglobin 10.3 g/dL      Hematocrit 30.0 %      MCV 89.8 fL      MCH 30.8 pg      MCHC 34.3 g/dL      RDW 12.3 %      RDW-SD 40.3 fl      MPV 13.0 fL      Platelets 163 10*3/mm3      Neutrophil % 85.1 %      Lymphocyte % 7.4 %      Monocyte % 6.8 %      Eosinophil % 0.0 %      Basophil % 0.1 %      Immature Grans % 0.6 %      Neutrophils, Absolute 15.49 10*3/mm3      Lymphocytes, Absolute 1.34 10*3/mm3      Monocytes, Absolute 1.24 10*3/mm3      Eosinophils, Absolute 0.00 10*3/mm3      Basophils, Absolute 0.01 10*3/mm3      Immature Grans, Absolute 0.10 10*3/mm3      nRBC 0.0 /100 WBC     Comprehensive Metabolic Panel [[id number]]  (Abnormal) Collected: 12/19/22 1740    Specimen: Blood from Arm, Right Updated: 12/19/22 1933     Glucose 76 mg/dL      BUN 8 mg/dL      Creatinine 0.66 mg/dL      Sodium 135 mmol/L      Potassium 3.9 mmol/L      Chloride 98 mmol/L      CO2 20.7 mmol/L      Calcium 10.0 mg/dL      Total Protein 7.6 g/dL      Albumin 4.00 g/dL      ALT (SGPT) 10 U/L      AST (SGOT) 21 U/L      Alkaline Phosphatase 195 U/L      Total Bilirubin 0.4 mg/dL      Globulin 3.6 gm/dL      A/G Ratio 1.1 g/dL      BUN/Creatinine Ratio 12.1     Anion Gap 16.3 mmol/L      eGFR 123.5 mL/min/1.73      Comment: National Kidney Foundation and American Society of Nephrology (ASN) Task Force recommended calculation based on the Chronic Kidney Disease Epidemiology Collaboration (CKD-EPI) equation refit without adjustment for race.       Narrative:      GFR Normal >60  Chronic Kidney Disease <60  Kidney Failure <15      CBC & Differential [[id number]]  (Abnormal)  Collected: 12/19/22 1740    Specimen: Blood from Arm, Right Updated: 12/19/22 1918    Narrative:      The following orders were created for panel order CBC & Differential.  Procedure                               Abnormality         Status                     ---------                               -----------         ------                     CBC Auto Differential[[id number]]        Abnormal            Final result                 Please view results for these tests on the individual orders.    CBC Auto Differential [[id number]]  (Abnormal) Collected: 12/19/22 1740    Specimen: Blood from Arm, Right Updated: 12/19/22 1918     WBC 16.83 10*3/mm3      RBC 4.45 10*6/mm3      Hemoglobin 13.6 g/dL      Hematocrit 38.1 %      MCV 85.6 fL      MCH 30.6 pg      MCHC 35.7 g/dL      RDW 12.1 %      RDW-SD 37.9 fl      MPV 12.9 fL      Platelets 197 10*3/mm3      Neutrophil % 90.7 %      Lymphocyte % 5.8 %      Monocyte % 2.7 %      Eosinophil % 0.0 %      Basophil % 0.1 %      Neutrophils, Absolute 15.25 10*3/mm3      Lymphocytes, Absolute 0.98 10*3/mm3      Monocytes, Absolute 0.46 10*3/mm3      Eosinophils, Absolute 0.00 10*3/mm3      Basophils, Absolute 0.02 10*3/mm3     Group B Streptococcus Culture - Swab, Vaginal/Rectum [[id number]] Resulted: 11/28/22    Specimen: Swab from Vaginal/Rectum Updated: 12/19/22 1541     External Strep Group B Ag Positive    HIV-1 Antibody, EIA [[id number]] Resulted: 05/10/22    Specimen: Blood Updated: 12/19/22 1540     External HIV Antibody Negative    Hepatitis C Antibody [[id number]] Resulted: 05/10/22    Specimen: Blood Updated: 12/19/22 1540     External Hepatitis C Ab Negative    Hepatitis B Surface Antigen [[id number]] Resulted: 05/10/22    Specimen: Blood Updated: 12/19/22 1540     External Hepatitis B Surface Ag Negative    RPR [[id number]] Resulted: 05/10/22    Specimen: Blood Updated: 12/19/22 1540     External RPR Non-Reactive    Rubella Antibody, IgG [[id number]] Resulted: 05/10/22     Specimen: Blood Updated: 12/19/22 1540     External Rubella Qual Immune          Imaging Results (Last 48 Hours)     ** No results found for the last 48 hours. **        ECG/EMG Results (last 48 hours)     ** No results found for the last 48 hours. **          Orders (last 48 hrs)      Start     Ordered    12/20/22 0900  docusate sodium (COLACE) capsule 100 mg  2 Times Daily         12/20/22 0142 12/20/22 0809  Diet: Regular/House Diet; Texture: Regular Texture (IDDSI 7); Fluid Consistency: Thin (IDDSI 0)  Diet Effective Now        Comments: No pork    12/20/22 0809    12/20/22 0800  Sitz Bath  3 Times Daily        Comments: PRN    12/20/22 0142 12/20/22 0724  Advance Diet as Tolerated  Until Discontinued        Comments: NO PORK    12/20/22 0724    12/20/22 0143  Transfer Patient  Once         12/20/22 0142 12/20/22 0143  Code Status and Medical Interventions:  Continuous         12/20/22 0142 12/20/22 0143  Vital Signs Per hospital policy  Per Hospital Policy         12/20/22 0142 12/20/22 0143  Notify Physician  Until Discontinued         12/20/22 0142 12/20/22 0143  Up Ad Lynn  Until Discontinued         12/20/22 0142 12/20/22 0143  Patient May Shower  Once         12/20/22 0142 12/20/22 0143  Advance Diet as Tolerated  Until Discontinued,   Status:  Canceled         12/20/22 0142 12/20/22 0143  Fundal and Lochia Check  Per Hospital Policy        Comments: Q 15 min x 4, Q 30 min x 2, then Q Shift    12/20/22 0142 12/20/22 0143  RN to Assess Rh Status & Place RhIG Evaluation Order if Indicated  Continuous         12/20/22 0142 12/20/22 0143  Bladder Assessment  Per Order Details        Comments: Postpartum 1) Upon Admission to Unit & Every 4 Hours PRN Until Voiding. 2) Out of Bed to Void in 8 Hours.    12/20/22 0142 12/20/22 0143  Straight Cath  Per Order Details        Comments: Postpartum: If Distended & Unable to Void, May Repeat Once.    12/20/22 0142 12/20/22 0143   Indwelling Urinary Catheter  Per Order Details        Comments: Postpartum : After Straight Cathed x2 or if Greater Than 1000mL Residual, Insert Indwelling Urinary Catheter Until Further MD Order.    12/20/22 0142 12/20/22 0143  Remove Vaginal Packing  Once         12/20/22 0142 12/20/22 0143  Breast pump to bed  Once         12/20/22 0142 12/20/22 0143  If indicated -- Please administer RH Immunoglobulin based on results of cord blood evaluation and fetal screen lab tests, pharmacy to dispense  Per Order Details        Comments: See Process Instructions For Reference Range Details.    12/20/22 0142 12/20/22 0143  CBC & Differential  Morning Draw        Comments: Lab to draw      12/20/22 0142 12/20/22 0143  CBC Auto Differential  PROCEDURE ONCE        Comments: Lab to draw      12/20/22 0142 12/20/22 0142  tranexamic acid 1000 mg in 100 mL 0.7% NaCl infusion (premix)  Once As Needed         12/20/22 0142 12/20/22 0142  Ambulate Patient  Every Shift       12/20/22 0142 12/20/22 0142  miSOPROStol (CYTOTEC) tablet 600 mcg  Once As Needed         12/20/22 0142 12/20/22 0142  hydrOXYzine (ATARAX) tablet 50 mg  Nightly PRN         12/20/22 0142 12/20/22 0142  bisacodyl (DULCOLAX) suppository 10 mg  Daily PRN         12/20/22 0142 12/20/22 0142  magnesium hydroxide (MILK OF MAGNESIA) suspension 10 mL  Daily PRN         12/20/22 0142 12/20/22 0142  benzocaine-menthol (DERMOPLAST) 20-0.5 % topical spray  As Needed         12/20/22 0142 12/20/22 0142  Hydrocort-Pramoxine (Perianal) (PROCTOFOAM-HS) 1-1 % rectal foam 1 application  As Needed         12/20/22 0142 12/20/22 0142  Hydrocortisone (Perianal) (ANUSOL-HC) 2.5 % rectal cream 1 application  As Needed         12/20/22 0142 12/20/22 0142  benzocaine (AMERICAINE) 20 % rectal ointment 1 application  As Needed         12/20/22 0142 12/20/22 0142  ondansetron (ZOFRAN) tablet 4 mg  Every 8 Hours PRN         12/20/22 0143     12/20/22 0142  ondansetron (ZOFRAN) injection 4 mg  Every 6 Hours PRN         12/20/22 0142    12/20/22 0142  promethazine (PHENERGAN) tablet 25 mg  Every 6 Hours PRN        See Hyperspace for full Linked Orders Report.    12/20/22 0142    12/20/22 0142  promethazine (PHENERGAN) suppository 12.5 mg  Every 6 Hours PRN        See Hyperspace for full Linked Orders Report.    12/20/22 0142    12/20/22 0142  calcium carbonate (TUMS) chewable tablet 500 mg (200 mg elemental)  3 Times Daily PRN         12/20/22 0142    12/19/22 2247  oxytocin (PITOCIN) 30 units in 0.9% sodium chloride 500 mL (premix)  Continuous PRN         12/19/22 2247 12/19/22 2200  Lidocaine HCl gel (XYLOCAINE) urethral/mucosal syringe  Once         12/19/22 2112 12/19/22 2156  ibuprofen (ADVIL,MOTRIN) tablet 800 mg  Every 8 Hours PRN         12/19/22 2156 12/19/22 2156  oxyCODONE-acetaminophen (PERCOCET) 5-325 MG per tablet 1 tablet  Every 4 Hours PRN         12/19/22 2156 12/19/22 2156  oxyCODONE-acetaminophen (PERCOCET)  MG per tablet 1 tablet  Every 4 Hours PRN         12/19/22 2156 12/19/22 2146  VTE Prophylaxis Not Indicated: No Risk Factors (0); </= 3 (Low Risk)  Once         12/19/22 2146 12/19/22 2115  oxytocin (PITOCIN) 30 units in 0.9% sodium chloride 500 mL (premix)  Continuous        See Hyperspace for full Linked Orders Report.    12/19/22 2000 12/19/22 2100  sodium chloride 0.9 % flush 10 mL  Every 12 Hours Scheduled,   Status:  Discontinued         12/19/22 1558 12/19/22 2045  penicillin G in iso-osmotic dextrose IVPB 3 million units (premix)  Every 4 Hours,   Status:  Discontinued        See Hyperspace for full Linked Orders Report.    12/19/22 1558 12/19/22 2000  oxytocin (PITOCIN) 30 units in 0.9% sodium chloride 500 mL (premix)  Once        See Hyperspace for full Linked Orders Report.    12/19/22 2000 12/19/22 2000  methylergonovine (METHERGINE) injection 200 mcg  Once As Needed,   Status:   Discontinued         12/19/22 2000 12/19/22 2000  carboprost (HEMABATE) injection 250 mcg  Every 15 Minutes PRN,   Status:  Discontinued         12/19/22 2000 12/19/22 2000  miSOPROStol (CYTOTEC) tablet 800 mcg  Once As Needed,   Status:  Discontinued         12/19/22 2000 12/19/22 1902  Manual Differential  Once,   Status:  Canceled         12/19/22 1901    12/19/22 1854  Patient May Shower  Once,   Status:  Canceled         12/19/22 1853    12/19/22 1645  lactated ringers infusion  Continuous,   Status:  Discontinued         12/19/22 1558    12/19/22 1645  penicillin G potassium 5 Million Units in sodium chloride 0.9 % 100 mL IVPB-VTB  Once        See Soraida for full Linked Orders Report.    12/19/22 1558    12/19/22 1559  Code Status and Medical Interventions:  Continuous,   Status:  Canceled         12/19/22 1558    12/19/22 1559  Obtain Informed Consent  Once         12/19/22 1558    12/19/22 1559  Vital Signs Per Hospital Policy  Per Hospital Policy,   Status:  Canceled         12/19/22 1558    12/19/22 1559  Mini-Prep Prior to Delivery  Once,   Status:  Canceled         12/19/22 1558    12/19/22 1559  Continuous Fetal Monitoring With NST on Admission and Prior to Initiation of Oxytocin.  Per Order Details,   Status:  Canceled        Comments: Continuous Fetal Monitoring With NST on Admission & Prior to Initiation of Oxytocin.    12/19/22 1558    12/19/22 1559  External Uterine Contraction Monitoring  Per Hospital Policy,   Status:  Canceled         12/19/22 1558    12/19/22 1559  Notify Provider (Specified)  Until Discontinued,   Status:  Canceled         12/19/22 1558    12/19/22 1559  Notify Provider of Tachysystole (Per Hospital Algorithm)  Until Discontinued,   Status:  Canceled         12/19/22 1558    12/19/22 1559  Notify Provider if Membranes Ruptured, Bleeding Greater Than 1 Pad Per Hour, Fetal Heart Tone Abnormality or Severe Pain  Until Discontinued,   Status:  Canceled          22 1558    22 1559  Initiate Group Beta Strep (GBS) Prophylaxis Protocol, If Criteria Met  Continuous,   Status:  Canceled        Comments: NO TREATMENT RECOMMENDED IF: 1) Maternal GBS Status Known Negative 2) Scheduled  Birth With Intact Membranes, Not in Labor 3) Maternal GBS Status Unknown, No Risk Factors  TREAT WITH ANTIBIOTICS IF:  1) Maternal GBS Status Known Positive 2) Maternal GBS Status Unknown With Risk Factors: a)  Previous Infant Affected By GBS Infection b) GBS Urinary Tract Infection (UTI) or Bacteriuria During Pregnancy c) Unexplained Maternal Fever (100.4F (38C) or Greater) During Labor d)  Prolonged Rupture of Membranes (18 or More Hours) e) Gestational Age Less Than 37 Weeks    22 1558    22 1559  Inpatient Anesthesiology Consult  Once,   Status:  Canceled        Specialty:  Anesthesiology  Provider:  (Not yet assigned)    22 1558    22 1559  CBC & Differential  Once         22 1558    22 1559  Comprehensive Metabolic Panel  Once         22 1558    22 1559  Type & Screen  Once         22 1558    22 1559  Insert Peripheral IV  Once         22 1558    22 1559  Saline Lock & Maintain IV Access  Continuous,   Status:  Canceled         22 1558    22 1559  Diet: Texture: Regular Texture (IDDSI 7); Fluid Consistency: Regular Consistency; Liquid Diets; Clear Liquid  Diet Effective Now,   Status:  Canceled         22 1558    22 1559  CBC Auto Differential  PROCEDURE ONCE         22 1558    22 1558  Position Change - For Intra-Uterine Resusitation for Hypertonus, HyperStimulation or Non-Reassuring Fetal Status  As Needed,   Status:  Canceled       22 1558    22 1558  sodium chloride 0.9 % flush 10 mL  As Needed,   Status:  Discontinued         22 1558    22 1558  lactated ringers bolus 1,000 mL  Once As Needed         22 1558    22 1558   acetaminophen (TYLENOL) tablet 650 mg  Every 4 Hours PRN,   Status:  Discontinued         12/19/22 1558    12/19/22 1558  ondansetron (ZOFRAN) tablet 4 mg  Every 6 Hours PRN,   Status:  Discontinued        See Hyperspace for full Linked Orders Report.    12/19/22 1558    12/19/22 1558  ondansetron (ZOFRAN) injection 4 mg  Every 6 Hours PRN,   Status:  Discontinued        See Hyperspace for full Linked Orders Report.    12/19/22 1558    12/19/22 1558  terbutaline (BRETHINE) injection 0.25 mg  As Needed,   Status:  Discontinued         12/19/22 1558    12/19/22 1558  mineral oil liquid 30 mL  Once As Needed,   Status:  Discontinued         12/19/22 1558    12/19/22 1558  famotidine (PEPCID) injection 20 mg  2 Times Daily PRN,   Status:  Discontinued        See Hyperspace for full Linked Orders Report.    12/19/22 1558    12/19/22 1558  famotidine (PEPCID) tablet 20 mg  2 Times Daily PRN,   Status:  Discontinued        See Hyperspace for full Linked Orders Report.    12/19/22 1558    12/19/22 1557  VTE Prophylaxis Not Indicated: Known Thrombophilic Condition (3); </= 3 (Low Risk)  Once         12/19/22 1557    12/19/22 1556  Admit To Obstetrics Inpatient  Once         12/19/22 1557    12/19/22 1535  Urinalysis With Microscopic If Indicated (No Culture) - Urine, Clean Catch  Once,   Status:  Canceled         12/19/22 1534    12/19/22 1534  Vital Signs  Per Hospital Policy,   Status:  Canceled        Comments: Repeat blood pressure every 30 minutes, until specified.    12/19/22 1534    12/19/22 1534  Fetal Nonstress Test  Once,   Status:  Canceled        Comments: For any patient >= 24 wks gestation.    12/19/22 1534    12/19/22 1534  Pulse Oximetry, Spot  Once,   Status:  Canceled         12/19/22 1534    12/19/22 1534  POC Glucose STAT  STAT,   Status:  Canceled        Comments: For any patient with any type of diabetes.      12/19/22 1534    12/19/22 1534  Continuous Uterine Monitoring - For Gestational Age >24 weeks   Once,   Status:  Canceled         12/19/22 1534    12/19/22 1534  NPO Diet NPO Type: Strict NPO  Diet Effective Now,   Status:  Canceled         12/19/22 1534    12/19/22 1534  Vaginal Exam  Once,   Status:  Canceled        Comments: Perform unless patient has vaginal bleeding without known placental site, known placental previa, <36 weeks with no abdominal , or complain of ROM and <36 weeks    12/19/22 1534    12/19/22 1534  POC PH Fluid (Nitrazine)  Once,   Status:  Canceled        Comments: If patient is presenting with possible ROM or leaking      12/19/22 1534    12/19/22 0000  Antibody Screen        Comments: This is an external result entered through the Results Console.      12/19/22 1540    12/19/22 0000  ABO / Rh        Comments: This is an external result entered through the Results Console.      12/19/22 1540    12/19/22 0000  Hepatitis C Antibody        Comments: This is an external result entered through the Results Console.      12/19/22 1540    12/19/22 0000  Hepatitis B Surface Antigen        Comments: This is an external result entered through the Results Console.      12/19/22 1540    12/19/22 0000  RPR        Comments: This is an external result entered through the Results Console.      12/19/22 1540    12/19/22 0000  Rubella Antibody, IgG        Comments: This is an external result entered through the Results Console.      12/19/22 1540    12/19/22 0000  HIV-1 Antibody, EIA        Comments: This is an external result entered through the Results Console.      12/19/22 1540    12/19/22 0000  Group B Streptococcus Culture - Swab, Vaginal/Rectum        Comments: This is an external result entered through the Results Console.      12/19/22 1541    Unscheduled  Apply Ice to Perineum  As Needed      Comments: For 20 min q 2 hrs    12/20/22 0142    Unscheduled  Waffle Cushion  As Needed      Comments: For perineal discomfort    12/20/22 0142    Unscheduled  Donut Ring  As Needed      Comments: For  perineal pain    22    Unscheduled  Kpad  As Needed      Comments: For pain    22    Unscheduled  Apply witch hazel pads / TUCKS to perineum as needed for comfort PRN  As Needed       22    Unscheduled  Warm compress  As Needed       22    Unscheduled  Apply ace wrap, tight bra, or binder  As Needed       22    Unscheduled  Apply ice packs  As Needed       22    --  Prenatal Vit-Fe Fumarate-FA (PRENATAL VITAMIN PO)         22 1539    Signed and Held  Notify Provider (Specified)  Until Discontinued         Signed and Held    Signed and Held  Vital Signs Per Hospital Policy  Per Hospital Policy         Signed and Held    Signed and Held  Fundal & Lochia Check  Per Order Details      Comments: Every 15 Minutes x4, Then Every 30 Minutes x2, Then Every Shift    Signed and Held    Signed and Held  Fundal & Lochia Check  Every Shift       Signed and Held    Signed and Held  Diet: Regular/House Diet; Texture: Regular Texture (IDDSI 7); Fluid Consistency: Thin (IDDSI 0)  Diet Effective Now         Signed and Held    Signed and Held  Advance Diet as Tolerated  Until Discontinued         Signed and Held                 Operative/Procedure Notes (last 48 hours)      Priyanka Roman MD at 22 2146          [facility]  Vaginal Delivery Note    Patient Name: Enedina Maxwell  :  1995  MRN:  [id number]          * No active hospital problems. *      Labor status: Active spontaneous labor       Delivery     Delivery: Vaginal, Spontaneous     YOB: 2022    Time of Birth: 9:22 PM      Anesthesia: Local     Delivering clinician: Priyanka Roman MD       Infant    Findings: Viable female  infant    Infant observations: Weight: 3906 g (8 lb 9.8 oz)   Observations/Comments:  Scale      Apgars: 8  @ 1 minute /    9  @ 5 minutes     Placenta, Cord, and Fluid    Placenta delivered  Expressed   at  2022  9:26 PM     Cord: 2 vessels   present.   Cord blood obtained: Yes    Cord gases obtained:  No      Repair    Episiotomy: No   Lacerations: Second midline         Estimated Blood Loss:  Quantitative Blood Loss:   400 mls.  Quantitative Blood Loss (mL): 434 mL         Delivery narrative: The patient is a 27 y.o.  at 38w6d.  Presented with spontaneous rupture of membranes and contractions.  Labored well and chose to go unmedicated aside from nitrous oxide. Progressed normally to C/C/+1.  Fetal status reassuring throughout.  of viable female infant  @ 9:22 PM  over an intact perineum. Exaggerated Ej used to facilitate delivery with mild shoulder dystocia.  Right shoulder delivered anteriorly. 3906 g (8 lb 9.8 oz) .  Placenta delivered manually due to cord beginning to pull free of placenta, intact with 3 vessel cord. Cervix and rectum intact. Uterus wiped clean due to increase in bleeding.  Second degree laceration repaired in usual fashion with 3-0 monocryl suture with lidocaine as analgesic.   Mother and baby recovering good condition.       Priyanka Roman MD  22  06:14 EST  Will refresh later to include QBL.      Electronically signed by Priyanka Roman MD at 22 0614          Physician Progress Notes (last 48 hours)      Shira Olivier APRN at 22 0900          [facility]  Vaginal Delivery Progress Note    Patient Name: Enedina Maxwell  :  1995  MRN:  [id number]      Subjective   Postpartum Day 1: Vaginal Delivery of a female infant.     The patient feels well without complaints.  Her pain is well controlled.  Reports normal lochia.     The patient plans to breastfeed.    Objective     Vital Signs Range for the last 24 hours  Temperature: Temp:  [98.2 °F (36.8 °C)-99.4 °F (37.4 °C)] 98.9 °F (37.2 °C)       BP: BP: ()/(54-89) 101/61   Pulse: Heart Rate:  [] 75   Respirations: Resp:  [16-18] 18                       Physical Exam:  General: Awake and alert  Abdomen: Fundus: firm,  non tender, 1 below umbilicus  Extremities:  trace edema, NT     Labs:     Results from last 7 days   Lab Units 22  0444 22  1740   WBC 10*3/mm3 18.18* 16.83*   HEMOGLOBIN g/dL 10.3* 13.6   HEMATOCRIT % 30.0* 38.1   PLATELETS 10*3/mm3 163 197       Prenatal labs results reviewed:  Yes   Rubella:  Immune  Rh Status:    RH type   Date Value Ref Range Status   2022 Positive  Final         Assessment & Plan  : 1. PPD1 S/P  - Doing well, continue usual cares.           * No active hospital problems. *          SMOOTH Billings  2022  09:18 EST    Electronically signed by Shira Olivier APRN at 22 0918       Consult Notes (last 48 hours)  Notes from 22 1219 through 22 1219   No notes of this type exist for this encounter.

## 2022-12-20 NOTE — PROGRESS NOTES
[facility]  Vaginal Delivery Progress Note    Patient Name: Enedina Maxwell  :  1995  MRN:  [id number]      Subjective   Postpartum Day 1: Vaginal Delivery of a female infant.     The patient feels well without complaints.  Her pain is well controlled.  Reports normal lochia.     The patient plans to breastfeed.    Objective     Vital Signs Range for the last 24 hours  Temperature: Temp:  [98.2 °F (36.8 °C)-99.4 °F (37.4 °C)] 98.9 °F (37.2 °C)       BP: BP: ()/(54-89) 101/61   Pulse: Heart Rate:  [] 75   Respirations: Resp:  [16-18] 18                       Physical Exam:  General: Awake and alert  Abdomen: Fundus: firm, non tender, 1 below umbilicus  Extremities:  trace edema, NT     Labs:     Results from last 7 days   Lab Units 22  0444 22  1740   WBC 10*3/mm3 18.18* 16.83*   HEMOGLOBIN g/dL 10.3* 13.6   HEMATOCRIT % 30.0* 38.1   PLATELETS 10*3/mm3 163 197       Prenatal labs results reviewed:  Yes   Rubella:  Immune  Rh Status:    RH type   Date Value Ref Range Status   2022 Positive  Final         Assessment & Plan  : 1. PPD1 S/P  - Doing well, continue usual cares.           * No active hospital problems. *          Shira Olivier, APRN  2022  09:18 EST

## 2022-12-20 NOTE — L&D DELIVERY NOTE
[facility]  Vaginal Delivery Note    Patient Name: Enedina Maxwell  :  1995  MRN:  [id number]          * No active hospital problems. *      Labor status: Active spontaneous labor       Delivery     Delivery: Vaginal, Spontaneous     YOB: 2022    Time of Birth: 9:22 PM      Anesthesia: Local     Delivering clinician: Priyanka Roman MD       Infant    Findings: Viable female  infant    Infant observations: Weight: 3906 g (8 lb 9.8 oz)   Observations/Comments:  Scale      Apgars: 8  @ 1 minute /    9  @ 5 minutes     Placenta, Cord, and Fluid    Placenta delivered  Expressed   at  2022  9:26 PM     Cord: 2 vessels  present.   Cord blood obtained: Yes    Cord gases obtained:  No      Repair    Episiotomy: No   Lacerations: Second midline         Estimated Blood Loss:  Quantitative Blood Loss:   400 mls.  Quantitative Blood Loss (mL): 434 mL         Delivery narrative: The patient is a 27 y.o.  at 38w6d.  Presented with spontaneous rupture of membranes and contractions.  Labored well and chose to go unmedicated aside from nitrous oxide. Progressed normally to C/C/+1.  Fetal status reassuring throughout.  of viable female infant  @ 9:22 PM  over an intact perineum. Exaggerated Ej used to facilitate delivery with mild shoulder dystocia.  Right shoulder delivered anteriorly. 3906 g (8 lb 9.8 oz) .  Placenta delivered manually due to cord beginning to pull free of placenta, intact with 3 vessel cord. Cervix and rectum intact. Uterus wiped clean due to increase in bleeding.  Second degree laceration repaired in usual fashion with 3-0 monocryl suture with lidocaine as analgesic.   Mother and baby recovering good condition.       Priyanka Roman MD  22  06:14 EST  Will refresh later to include QBL.

## 2022-12-20 NOTE — PLAN OF CARE
Goal Outcome Evaluation:      Progress: no change  Outcome Evaluation: NCB,FOB at Maternal grandmother at bedside for support. Pitocin given postpartum, plan to transfer to [facility].

## 2022-12-21 VITALS
WEIGHT: 245 LBS | RESPIRATION RATE: 16 BRPM | DIASTOLIC BLOOD PRESSURE: 74 MMHG | SYSTOLIC BLOOD PRESSURE: 113 MMHG | BODY MASS INDEX: 38.45 KG/M2 | HEART RATE: 75 BPM | OXYGEN SATURATION: 99 % | TEMPERATURE: 98.8 F | HEIGHT: 67 IN

## 2022-12-21 RX ORDER — IBUPROFEN 800 MG/1
800 TABLET ORAL EVERY 8 HOURS PRN
Qty: 60 TABLET | Refills: 0 | Status: SHIPPED | OUTPATIENT
Start: 2022-12-21

## 2022-12-21 RX ADMIN — DOCUSATE SODIUM 100 MG: 100 CAPSULE, LIQUID FILLED ORAL at 09:10

## 2022-12-21 RX ADMIN — IBUPROFEN 800 MG: 800 TABLET, FILM COATED ORAL at 09:10

## 2022-12-21 NOTE — PLAN OF CARE
Goal Outcome Evaluation:  Plan of Care Reviewed With: patient, spouse        Progress: improving  Pt vss, pain well controlled with motrin. Up ad issa. Discharge home today.   Problem: Adult Inpatient Plan of Care  Goal: Plan of Care Review  Outcome: Met  Flowsheets (Taken 12/21/2022 1143)  Progress: improving  Plan of Care Reviewed With:   patient   spouse  Outcome Evaluation: pt vss, feding well. adequate output. discharge home today.  Goal: Patient-Specific Goal (Individualized)  Outcome: Met  Goal: Absence of Hospital-Acquired Illness or Injury  Outcome: Met  Intervention: Identify and Manage Fall Risk  Recent Flowsheet Documentation  Taken 12/21/2022 1055 by Elena Rodas RN  Safety Promotion/Fall Prevention: safety round/check completed  Taken 12/21/2022 0910 by Elena Rodas RN  Safety Promotion/Fall Prevention: safety round/check completed  Intervention: Prevent and Manage VTE (Venous Thromboembolism) Risk  Recent Flowsheet Documentation  Taken 12/21/2022 0910 by Elena Rodas RN  Activity Management:   activity adjusted per tolerance   activity encouraged   up ad issa   ambulated in room  Goal: Optimal Comfort and Wellbeing  Outcome: Met  Intervention: Monitor Pain and Promote Comfort  Recent Flowsheet Documentation  Taken 12/21/2022 0910 by Elena Rodas RN  Pain Management Interventions:   see MAR   quiet environment facilitated   pain management plan reviewed with patient/caregiver  Intervention: Provide Person-Centered Care  Recent Flowsheet Documentation  Taken 12/21/2022 0910 by Elena Rodas RN  Trust Relationship/Rapport:   care explained   choices provided   emotional support provided   empathic listening provided   questions answered   questions encouraged   reassurance provided   thoughts/feelings acknowledged  Goal: Readiness for Transition of Care  Outcome: Met     Problem: Bleeding (Labor)  Goal: Hemostasis  Outcome: Met     Problem: Change in Fetal Wellbeing  (Labor)  Goal: Stable Fetal Wellbeing  Outcome: Met     Problem: Delayed Labor Progression (Labor)  Goal: Effective Progression to Delivery  Outcome: Met     Problem: Infection (Labor)  Goal: Absence of Infection Signs and Symptoms  Outcome: Met     Problem: Labor Pain (Labor)  Goal: Acceptable Pain Control  Outcome: Met     Problem: Uterine Tachysystole (Labor)  Goal: Normal Uterine Contraction Pattern  Outcome: Met

## 2022-12-21 NOTE — DISCHARGE SUMMARY
Date of Discharge:  2022    Discharge Diagnosis: vaginal delivery    Presenting Problem/History of Present Illness  Pregnancy [Z34.90]       Hospital Course  Patient is a 27 y.o. female presented with SROM at term. Had vaginal delivery of viable female infant per Dr. Roman.  No pp complications.  Ready for d/c.      Procedures Performed         Consults:   Consults     No orders found from 2022 to 2022.          Condition on Discharge:   Subjective   Postpartum Day 2 Vaginal Delivery.    The patient feels well without complaints.    Vital Signs  Temp:  [98.7 °F (37.1 °C)-98.8 °F (37.1 °C)] 98.8 °F (37.1 °C)  Heart Rate:  [65-75] 75  Resp:  [16-18] 16  BP: (101-113)/(54-74) 113/74    Physical Exam:   General: Awake and alert   Abdomen: Fundus: firm, non tender    Extremities:  Calves NT bilaterally    Assessment & Plan     PPD2  S/P  -   Stable for discharge. Instructions reviewed      Discharge Disposition  Home or Self Care    Discharge Medications     Discharge Medications      New Medications      Instructions Start Date   ibuprofen 800 MG tablet  Commonly known as: ADVIL,MOTRIN   800 mg, Oral, Every 8 Hours PRN         Continue These Medications      Instructions Start Date   PRENATAL VITAMIN PO   Oral               The patient has been prescribed a controlled substance.  She has been counseled on the risks associated with using the medication.   The addictive potential of this medication and alternatives were discussed carefully with this patient and she demonstrated understanding.  A ARI report has been obtained and reviewed.       Activity at Discharge: restrictions reviewed    Follow-up Appointments  No future appointments.      Test Results Pending at Discharge       SMOOTH Billings  22  09:00 EST

## 2022-12-21 NOTE — PLAN OF CARE
Goal Outcome Evaluation:      VSS, bleeding stable, pain controlled, working on breastfeeding,independent self care.

## 2023-07-19 NOTE — LACTATION NOTE
Baby latching well at this time in football position on left breast. Mom is supplementing baby with formula. Educated on supply and demand. Encouraged mom to review provided booklet on BF. Educated on baby's expected output and weight gain. Encouraged mom to call  as needed. She has [facility] info    Lactation Consult Note    Evaluation Completed: 2022 08:09 EST  Patient Name: Enedina Maxwell  :  1995  MRN:  [id number]     REFERRAL  INFORMATION:                                         DELIVERY HISTORY:        Skin to skin initiation date/time: 2022  9:23 PM   Skin to skin end date/time: 2022  10:20 PM        MATERNAL ASSESSMENT:                               INFANT ASSESSMENT:  Information for the patient's :  Indira Maxwell [[id number]]   No past medical history on file.                                                                                                     MATERNAL INFANT FEEDING:                                                                       EQUIPMENT TYPE:                                 BREAST PUMPING:          LACTATION REFERRALS:                                         
P1. Term baby, hx PCOS but no INF. Mom reports baby is BF well. She took BF classes. Encouraged to BF on demand but at least every 2-3 hours and offer both breasts with each feeding. Encouraged mom to review provided booklet on BF. Educated mom on hand expression, milk coming in. Mom has personal pump. Encouraged to call LC as needed.    Lactation Consult Note    Evaluation Completed: 2022 08:58 EST  Patient Name: Enedina Maxwell  :  1995  MRN:  [id number]     REFERRAL  INFORMATION:                                         DELIVERY HISTORY:        Skin to skin initiation date/time: 2022  9:23 PM   Skin to skin end date/time: 2022  10:20 PM        MATERNAL ASSESSMENT:                               INFANT ASSESSMENT:  Information for the patient's :  Indira Maxwell [[id number]]   No past medical history on file.                                                                                                     MATERNAL INFANT FEEDING:                                                                       EQUIPMENT TYPE:                                 BREAST PUMPING:          LACTATION REFERRALS:                                         
This note was copied from a baby's chart.  Mom reports baby fed approx 5 mins on the right side but is now sleepy & she's having trouble waking her to latch on the left.  Suggested she either give baby to dad or lay baby in crib to rouse her then try for a few minutes to rouse.  If unable to rouse well enough for baby to achieve latch, then pump w/hand pump & syringe any EBM to baby.  Hand pump, syringes & all supplies given & educated on use/cleaning for supplementing w/EBM.  Encouraged lots of STS care to support temps & bgm's also.  Encouraged to call when needed.  Verbalized understanding.    
What Type Of Note Output Would You Prefer (Optional)?: Standard Output
Is This A New Presentation, Or A Follow-Up?: Rash
Additional History: Patient went to urgent care were they stated the rash looked like staph infection and they gave cephelexin 500mg twice daily for 20 days.